# Patient Record
Sex: FEMALE | Race: WHITE | NOT HISPANIC OR LATINO | Employment: FULL TIME | URBAN - METROPOLITAN AREA
[De-identification: names, ages, dates, MRNs, and addresses within clinical notes are randomized per-mention and may not be internally consistent; named-entity substitution may affect disease eponyms.]

---

## 2017-10-29 ENCOUNTER — OFFICE VISIT (OUTPATIENT)
Dept: URGENT CARE | Facility: CLINIC | Age: 52
End: 2017-10-29
Payer: COMMERCIAL

## 2017-10-29 ENCOUNTER — APPOINTMENT (OUTPATIENT)
Dept: RADIOLOGY | Facility: CLINIC | Age: 52
End: 2017-10-29
Payer: COMMERCIAL

## 2017-10-29 DIAGNOSIS — R05.9 COUGH: ICD-10-CM

## 2017-10-29 DIAGNOSIS — R06.02 SHORTNESS OF BREATH: ICD-10-CM

## 2017-10-29 PROCEDURE — 99203 OFFICE O/P NEW LOW 30 MIN: CPT

## 2017-10-29 PROCEDURE — G0463 HOSPITAL OUTPT CLINIC VISIT: HCPCS

## 2017-10-29 PROCEDURE — 71020 HB CHEST X-RAY 2VW FRONTAL&LATL: CPT

## 2017-11-03 ENCOUNTER — APPOINTMENT (OUTPATIENT)
Dept: RADIOLOGY | Facility: CLINIC | Age: 52
End: 2017-11-03
Payer: COMMERCIAL

## 2017-11-03 ENCOUNTER — OFFICE VISIT (OUTPATIENT)
Dept: URGENT CARE | Facility: CLINIC | Age: 52
End: 2017-11-03
Payer: COMMERCIAL

## 2017-11-03 ENCOUNTER — TRANSCRIBE ORDERS (OUTPATIENT)
Dept: URGENT CARE | Facility: CLINIC | Age: 52
End: 2017-11-03

## 2017-11-03 DIAGNOSIS — R05.9 COUGH: ICD-10-CM

## 2017-11-03 DIAGNOSIS — R06.02 SHORTNESS OF BREATH: ICD-10-CM

## 2017-11-03 PROCEDURE — 71020 HB CHEST X-RAY 2VW FRONTAL&LATL: CPT

## 2017-11-03 PROCEDURE — G0463 HOSPITAL OUTPT CLINIC VISIT: HCPCS

## 2017-11-03 PROCEDURE — 99213 OFFICE O/P EST LOW 20 MIN: CPT

## 2017-11-03 PROCEDURE — 94640 AIRWAY INHALATION TREATMENT: CPT

## 2017-11-07 NOTE — PROGRESS NOTES
Assessment  1  Shortness of breath (786 05) (R06 02)    Plan  Cough    · Albuterol Sulfate (2 5 MG/3ML) 0 083% Inhalation Nebulization Solution   · Ipratropium Bromide 0 02 % Inhalation Solution  Cough, Shortness of breath    · * XR CHEST PA & LATERAL; Status:Complete;   Done: [id number] 04:57PM    Discussion/Summary  Discussion Summary:   Pt treated on 10/29 for bronchitis with azithromycin, prednisone, and ventolin but pt continues to worsenxray showing interstitial fluid- no pneumonia to proceed to [facility] for further evaluation r/o heart failure  Chief Complaint  1  Cough  Chief Complaint Free Text Note Form: Patient relates was seen here on Sunday for cough given z-pack, prednisone and taking mucinex  Denies fever  Here today because she is not feeling better and short of breath  History of Present Illness  HPI: 47yo F p/w productive cough, sob, wheezing, fever x 2 weeks  Pt was treated for bronchitis here on 10/30 with azithromycin, prednisone, and albuterol [id number]  Pt states she is no better and sxs are only worsening  Hospital Based Practices Required Assessment:   Abuse And Domestic Violence Screen    Yes, the patient is safe at home  -- The patient states no one is hurting them  Depression And Suicide Screen  No, the patient has not had thoughts of hurting themself  No, the patient has not felt depressed in the past 7 days  Prefered Language is  english  Primary Language is  english  Review of Systems  Focused-Female:   Constitutional: No fever, no chills, feels well, no tiredness, no recent weight gain or loss  ENT: sore throat-- and-- nasal discharge, but-- no earache,-- no nosebleeds,-- no hearing loss-- and-- no hoarseness  Cardiovascular: no complaints of slow or fast heart rate, no chest pain, no palpitations, no leg claudication or lower extremity edema     Respiratory: shortness of breath,-- cough,-- wheezing-- and-- shortness of breath during exertion, but-- no orthopnea-- and-- no PND  Breasts: no complaints of breast pain, breast lump or nipple discharge  Gastrointestinal: no complaints of abdominal pain, no constipation, no nausea or diarrhea, no vomiting, no bloody stools  Genitourinary: no complaints of dysuria, no incontinence, no pelvic pain, no dysmenorrhea, no vaginal discharge or abnormal vaginal bleeding  Musculoskeletal: no complaints of arthralgia, no myalgia, no joint swelling or stiffness, no limb pain or swelling  Integumentary: no complaints of skin rash or lesion, no itching or dry skin, no skin wounds  Neurological: no complaints of headache, no confusion, no numbness or tingling, no dizziness or fainting  ROS Reviewed:   ROS reviewed  Active Problems  1  Acute bronchitis (466 0) (J20 9)   2  Cough (786 2) (R05)   3  Shortness of breath (786 05) (R06 02)    Past Medical History  1  History of Anxiety (300 00) (F41 9)   2  History of back pain (V13 59) (Z87 39)   3  History of hypercholesterolemia (V12 29) (Z86 39)   4  History of hypertension (V12 59) (Z86 79)   5  History of hypothyroidism (V12 29) (Z86 39)  Active Problems And Past Medical History Reviewed: The active problems and past medical history were reviewed and updated today  Family History  Family History Reviewed: The family history was reviewed and updated today  Social History   · Active smoker (305 1) (F17 200)   · No alcohol use  Social History Reviewed: The social history was reviewed and is unchanged  Surgical History  Surgical History Reviewed: The surgical history was reviewed and updated today  Current Meds   1  AmLODIPine Besylate TABS; Therapy: (Recorded:80Jal9937) to Recorded   2  Atorvastatin Calcium 20 MG Oral Tablet; Therapy: (Recorded:29Oct2017) to Recorded   3  Azithromycin 250 MG Oral Tablet; TAKE 2 TABLETS ON DAY 1 THEN TAKE 1 TABLET A   DAY FOR 4 DAYS;    Therapy: [id number] to 24 [id number])  Requested for: 53-41-43-35; Last   Rx:2017 Ordered   4  Levothyroxine Sodium 100 MCG Oral Tablet; Therapy: (BAGALPBQ:67MAK4222) to Recorded   5  Losartan Potassium 25 MG Oral Tablet; Therapy: (XYODUUMS:53BWH9166) to Recorded   6  Metoprolol Tartrate 50 MG Oral Tablet; Therapy: (SXHSJCBX:19LRK2962) to Recorded   7  Montelukast Sodium 4 MG Oral Tablet Chewable; Therapy: (RTHFLQNM:55WMC0996) to Recorded   8  Morphine Sulfate ER 15 MG TB12;   Therapy: (OGWPWJRA:61FPO2365) to Recorded   9  Neurontin TABS; Therapy: (Recorded:2017) to Recorded   10  Paxil 20 MG Oral Tablet; Therapy: ([id number]) to Recorded   11  Plavix 75 MG Oral Tablet; Therapy: (BUGCFNOX:33JUF1624) to Recorded   12  PredniSONE 50 MG Oral Tablet; TAKE 1 TABLET DAILY WITH FOOD; Therapy: 29YRF4545 to (Kate Ng)  Requested for: 53-41-43-35; Last    Rx:2017 Ordered   13  Vicodin 5-300 MG Oral Tablet; Therapy: (PZAKXTAS:83DOS1118) to Recorded  Medication List Reviewed: The medication list was reviewed and updated today  Allergies  1  Sulfa Drugs   2  Vancomycin    Vitals  Signs   Recorded: 53NOL0320 04:43PM   Temperature: 99 5 F, Tympanic  Heart Rate: 75  Respiration: 24  Systolic: 466, RUE, Sitting  Diastolic: 72, RUE, Sitting  Height: 5 ft 4 in  Weight: 217 lb 2 oz  BMI Calculated: 37 27  BSA Calculated: 2 03  O2 Saturation: 96, RA  Pain Scale: 0    Physical Exam    Constitutional   General appearance: No acute distress, well appearing and well nourished  Pulmonary   Respiratory effort: No increased work of breathing or signs of respiratory distress  Auscultation of lungs: Abnormal   Auscultation of the lungs revealed expiratory wheezing,-- prolonged expiratory time-- and-- inspiratory wheezing  no rales or crackles were heard bilaterally  no rhonchi  no friction rub  diffuse wheezing bilaterally  no diminished breath sounds  Cardiovascular   Palpation of heart: Normal PMI, no thrills      Auscultation of heart: Normal rate and rhythm, normal S1 and S2, without murmurs  Examination of extremities for edema and/or varicosities: Normal     Abdomen   Abdomen: Non-tender, no masses  Liver and spleen: No hepatomegaly or splenomegaly  Musculoskeletal   Gait and station: Normal     Digits and nails: Normal without clubbing or cyanosis  Inspection/palpation of joints, bones, and muscles: Normal     Skin   Skin and subcutaneous tissue: Normal without rashes or lesions  Psychiatric   Orientation to person, place, and time: Normal     Mood and affect: Normal        Results/Data  * XR CHEST PA & LATERAL [id number] 04:57PM [facility] Order Number: [id number]   Performing Comments: ROOM 2     Test Name Result Flag Reference   XR CHEST PA & LATERAL (Report)     CHEST      INDICATION: Cough     COMPARISON: October 29, 2017     VIEWS: Frontal and lateral projections     IMAGES: 2     FINDINGS:        Cardiomediastinal silhouette appears unremarkable  Interstitial prominence in the lung seen  Lungs are hypoventilated   No acute airspace consolidation     Visualized osseous structures appear within normal limits for the patient's age  IMPRESSION:     Interstitial prominence in the lung may be due to mild congestion or due to hypoinflation     No acute airspace consolidation seen          Workstation performed: [id number]     Signed by:   Stanford Snowden MD   11/3/17       Signatures   Electronically signed by : SURENDRA Smith; Nov  3 2017  6:50PM EST                       (Author)    Electronically signed by : NANCIE Nicole ; Nov 6 2017 10:28AM EST                       (Co-author)

## 2018-01-18 NOTE — PROGRESS NOTES
Assessment    1  Active smoker (305 1) (F17 200)   2  Acute bronchitis (466 0) (J20 9)    Plan  Acute bronchitis    · Azithromycin 250 MG Oral Tablet; TAKE 2 TABLETS ON DAY 1 THEN TAKE 1  TABLET A DAY FOR 4 DAYS   · PredniSONE 50 MG Oral Tablet; TAKE 1 TABLET DAILY WITH FOOD  Cough    · * XR CHEST PA & LATERAL; Status:Active - Retrospective By Protocol Authorization; Requested [id number];     Discussion/Summary  Discussion Summary:   1) take antibiotic as prescribed  2) ventolin inhaler every 4 hours  3) take prednisone as prescribed  4) mucinex prn cough  Chief Complaint    1  Cough    History of Present Illness  HPI: 45yo F p/w cough productive of yellow sputum, wheezing, and sob x 1 week  Pt is everyday smoker and gets bronchitis at least yearly  Pt states she is short of breath only during coughing spells  Review of Systems  Focused-Female:   Constitutional: No fever, no chills, feels well, no tiredness, no recent weight gain or loss  ENT: no ear ache, no loss of hearing, no nosebleeds or nasal discharge, no sore throat or hoarseness  Cardiovascular: no complaints of slow or fast heart rate, no chest pain, no palpitations, no leg claudication or lower extremity edema  Respiratory: shortness of breath, cough and wheezing, but no orthopnea, no shortness of breath during exertion and no PND  Breasts: no complaints of breast pain, breast lump or nipple discharge  Gastrointestinal: no complaints of abdominal pain, no constipation, no nausea or diarrhea, no vomiting, no bloody stools  Genitourinary: no complaints of dysuria, no incontinence, no pelvic pain, no dysmenorrhea, no vaginal discharge or abnormal vaginal bleeding  Musculoskeletal: no complaints of arthralgia, no myalgia, no joint swelling or stiffness, no limb pain or swelling  Integumentary: no complaints of skin rash or lesion, no itching or dry skin, no skin wounds     Neurological: no complaints of headache, no confusion, no numbness or tingling, no dizziness or fainting  ROS Reviewed:   ROS reviewed  Past Medical History    1  History of Anxiety (300 00) (F41 9)   2  History of back pain (V13 59) (Z87 39)   3  History of hypercholesterolemia (V12 29) (Z86 39)   4  History of hypertension (V12 59) (Z86 79)   5  History of hypothyroidism (V12 29) (Z86 39)  Active Problems And Past Medical History Reviewed: The active problems and past medical history were reviewed and updated today  Family History  Family History Reviewed: The family history was reviewed and updated today  Social History    · Active smoker (305 1) (F17 200)   · No alcohol use  Social History Reviewed: The social history was reviewed and is unchanged  Surgical History  Surgical History Reviewed: The surgical history was reviewed and updated today  Current Meds   1  AmLODIPine Besylate TABS; Therapy: (Recorded:2017) to Recorded   2  Atorvastatin Calcium 20 MG Oral Tablet; Therapy: (Recorded:2017) to Recorded   3  Levothyroxine Sodium 100 MCG Oral Tablet; Therapy: (WIRBSXNK:97YIC8780) to Recorded   4  Losartan Potassium 25 MG Oral Tablet; Therapy: (PEYKFDJP:21PIA4572) to Recorded   5  Metoprolol Tartrate 50 MG Oral Tablet; Therapy: (TVCBHRKS:11IEB6752) to Recorded   6  Montelukast Sodium 4 MG Oral Tablet Chewable; Therapy: (CQDXGATW:81RQS3185) to Recorded   7  Morphine Sulfate ER 15 MG TB12;   Therapy: ([id number]) to Recorded   8  Neurontin TABS; Therapy: (FEJIODVT:20SNG9300) to Recorded   9  Paxil 20 MG Oral Tablet; Therapy: (Recorded:2017) to Recorded   10  Plavix 75 MG Oral Tablet; Therapy: (YXFTHFEZ:96HTC5290) to Recorded   11  Vicodin 5-300 MG Oral Tablet; Therapy: (OPVBNLXQ:23WCT1196) to Recorded  Medication List Reviewed: The medication list was reviewed and updated today  Allergies    1  Sulfa Drugs   2   Vancomycin    Vitals  Signs   Recorded: 92GYK6513 01:56PM   Temperature: 99 3 F, Tympanic  Heart Rate: 85  Respiration: 20  Systolic: 590  Diastolic: 80  Height: 5 ft 4 in  Weight: 215 lb   BMI Calculated: 36 9  BSA Calculated: 2 02  O2 Saturation: 97  Pain Scale: 5    Physical Exam    Constitutional   General appearance: No acute distress, well appearing and well nourished  Eyes   Conjunctiva and lids: No swelling, erythema or discharge  Pupils and irises: Equal, round and reactive to light  Ears, Nose, Mouth, and Throat   External inspection of ears and nose: Normal     Otoscopic examination: Tympanic membranes translucent with normal light reflex  Canals patent without erythema  Nasal mucosa, septum, and turbinates: Normal without edema or erythema  Oropharynx: Normal with no erythema, edema, exudate or lesions  Pulmonary   Respiratory effort: Abnormal   Respiratory rate: normal  Assessment of respiratory effort revealed normal rhythm and effort  Respiratory Findings: dry cough  Auscultation of lungs: Abnormal   Auscultation of the lungs revealed expiratory wheezing, prolonged expiratory time and inspiratory wheezing  no rales or crackles were heard bilaterally  no rhonchi  no friction rub  diffuse wheezing bilaterally  no diminished breath sounds  Cardiovascular   Palpation of heart: Normal PMI, no thrills  Auscultation of heart: Normal rate and rhythm, normal S1 and S2, without murmurs  Examination of extremities for edema and/or varicosities: Normal     Lymphatic   Palpation of lymph nodes in neck: No lymphadenopathy  Skin   Skin and subcutaneous tissue: Normal without rashes or lesions      Psychiatric   Orientation to person, place, and time: Normal     Mood and affect: Normal        Signatures   Electronically signed by : SURENDRA Davis; Oct 29 2017  2:13PM EST                       (Author)    Electronically signed by : NANCIE Inman ; Oct 30 2017  8:50AM EST                       (Co-author)

## 2019-01-17 ENCOUNTER — OFFICE VISIT (OUTPATIENT)
Dept: URGENT CARE | Facility: CLINIC | Age: 54
End: 2019-01-17
Payer: COMMERCIAL

## 2019-01-17 VITALS
HEART RATE: 77 BPM | WEIGHT: 212 LBS | DIASTOLIC BLOOD PRESSURE: 68 MMHG | TEMPERATURE: 98.3 F | RESPIRATION RATE: 18 BRPM | BODY MASS INDEX: 37.56 KG/M2 | HEIGHT: 63 IN | SYSTOLIC BLOOD PRESSURE: 100 MMHG | OXYGEN SATURATION: 97 %

## 2019-01-17 DIAGNOSIS — B34.9 VIRAL INFECTION: Primary | ICD-10-CM

## 2019-01-17 PROCEDURE — 99213 OFFICE O/P EST LOW 20 MIN: CPT | Performed by: PHYSICIAN ASSISTANT

## 2019-01-17 RX ORDER — ALBUTEROL SULFATE 1.25 MG/3ML
1.25 SOLUTION RESPIRATORY (INHALATION)
COMMUNITY
Start: 2015-07-27

## 2019-01-17 RX ORDER — LOSARTAN POTASSIUM 25 MG/1
TABLET ORAL
COMMUNITY

## 2019-01-17 RX ORDER — GABAPENTIN 800 MG/1
800 TABLET ORAL 3 TIMES DAILY
Refills: 1 | COMMUNITY
Start: 2018-10-26

## 2019-01-17 RX ORDER — CLINDAMYCIN PHOSPHATE 11.9 MG/ML
SOLUTION TOPICAL
COMMUNITY
Start: 2017-08-28

## 2019-01-17 RX ORDER — CLOPIDOGREL BISULFATE 75 MG/1
75 TABLET ORAL DAILY
Refills: 0 | COMMUNITY
Start: 2018-11-26

## 2019-01-17 RX ORDER — AMLODIPINE BESYLATE 10 MG/1
10 TABLET ORAL
COMMUNITY
Start: 2018-06-20

## 2019-01-17 RX ORDER — LEVOTHYROXINE SODIUM 0.1 MG/1
TABLET ORAL
COMMUNITY

## 2019-01-17 RX ORDER — GABAPENTIN 800 MG/1
TABLET ORAL
COMMUNITY

## 2019-01-17 RX ORDER — ALPRAZOLAM 0.5 MG/1
TABLET ORAL
COMMUNITY
Start: 2018-02-21

## 2019-01-17 RX ORDER — CETIRIZINE HYDROCHLORIDE 10 MG/1
10 TABLET ORAL DAILY
Qty: 30 TABLET | Refills: 0 | Status: SHIPPED | OUTPATIENT
Start: 2019-01-17

## 2019-01-17 RX ORDER — AMLODIPINE BESYLATE 10 MG/1
10 TABLET ORAL DAILY
Refills: 1 | COMMUNITY
Start: 2018-11-24

## 2019-01-17 RX ORDER — DIPHENOXYLATE HYDROCHLORIDE AND ATROPINE SULFATE 2.5; .025 MG/1; MG/1
1 TABLET ORAL
COMMUNITY
Start: 2013-02-04

## 2019-01-17 RX ORDER — OXYCODONE AND ACETAMINOPHEN 10; 325 MG/1; MG/1
TABLET ORAL
Refills: 0 | COMMUNITY
Start: 2019-01-10

## 2019-01-17 RX ORDER — FLUTICASONE PROPIONATE 50 MCG
2 SPRAY, SUSPENSION (ML) NASAL DAILY
Qty: 16 G | Refills: 0 | Status: SHIPPED | OUTPATIENT
Start: 2019-01-17

## 2019-01-17 RX ORDER — ATORVASTATIN CALCIUM 80 MG/1
TABLET, FILM COATED ORAL
Refills: 0 | COMMUNITY
Start: 2019-01-07

## 2019-01-17 RX ORDER — PAROXETINE HYDROCHLORIDE 40 MG/1
TABLET, FILM COATED ORAL
COMMUNITY
Start: 2017-11-16

## 2019-01-17 RX ORDER — MONTELUKAST SODIUM 10 MG/1
TABLET ORAL
COMMUNITY
Start: 2018-08-09

## 2019-01-17 RX ORDER — ATORVASTATIN CALCIUM 80 MG/1
80 TABLET, FILM COATED ORAL
COMMUNITY
Start: 2019-01-07

## 2019-01-17 RX ORDER — BENZONATATE 100 MG/1
100 CAPSULE ORAL 3 TIMES DAILY PRN
Qty: 15 CAPSULE | Refills: 0 | Status: SHIPPED | OUTPATIENT
Start: 2019-01-17

## 2019-01-17 RX ORDER — LOSARTAN POTASSIUM AND HYDROCHLOROTHIAZIDE 12.5; 1 MG/1; MG/1
1 TABLET ORAL
COMMUNITY
Start: 2018-12-27

## 2019-01-17 RX ORDER — CLOPIDOGREL BISULFATE 75 MG/1
75 TABLET ORAL
COMMUNITY
Start: 2018-11-26

## 2019-01-17 RX ORDER — LEVOTHYROXINE SODIUM 0.15 MG/1
150 TABLET ORAL
COMMUNITY
Start: 2018-06-20

## 2019-01-17 NOTE — PROGRESS NOTES
[facility]        NAME: Brandi Client is a 48 y o  female  : 1965    MRN: [id number]  DATE: 2019  TIME: 6:13 PM    Assessment and Plan   Viral infection [B34 9]  1  Viral infection  fluticasone (FLONASE) 50 mcg/act nasal spray    cetirizine (ZyrTEC) 10 mg tablet    benzonatate (TESSALON PERLES) 100 mg capsule    Ambulatory referral to [facility]         Patient Instructions     Motrin and/or Tylenol as needed for fever control and aches and pains  Drink plenty of fluids and stay well hydrated  No smoking  Follow up with PCP in 3-5 days  Proceed to  ER if symptoms worsen  Chief Complaint     Chief Complaint   Patient presents with    Influenza     fever, body aches, headache, nausea, diarrhea, congestion, cough  Symptoms for five days  History of Present Illness       19-year-old female presents with five-day history of runny nose congestion cough body aches and pains and fevers  Denies any chest pain or abdominal pain  Had a couple bouts of diarrhea  Sick contact with daughter with some type of viral infection  Has been taking over-the-counter Mucinex without any relief in symptoms  Patient is a smoker  And is asthmatic  Generalized Body Aches   The current episode started in the past 7 days  The problem has been waxing and waning since onset  The pain is moderate  Nothing aggravates the symptoms  Associated symptoms include congestion, rhinorrhea, fatigue, a fever, coughing, diarrhea, joint pain and muscle aches  Treatments tried: Mucinex  The treatment provided mild relief  The fever has been present for 1 to 2 days  The maximum temperature noted was 102 2 to 104 0 F  The temperature was taken using an oral thermometer  The cough has no precipitants  The cough is non-productive  There is no color change associated with the cough  Nothing relieves the cough  Nothing worsens the cough  There is nasal congestion   The congestion does not interfere with sleep  The congestion does not interfere with eating or drinking  The rhinorrhea has been occurring frequently  The nasal discharge has a clear appearance  She has been experiencing a mild sore throat  The sore throat is characterized by pain only  The diarrhea occurs 2 to 4 times per day  The diarrhea is watery  There were sick contacts at home  Review of Systems   Review of Systems   Constitutional: Positive for fatigue and fever  HENT: Positive for congestion and rhinorrhea  Eyes: Negative  Respiratory: Positive for cough  Cardiovascular: Negative  Gastrointestinal: Positive for diarrhea  Musculoskeletal: Positive for joint pain  Skin: Negative  Neurological: Negative  Current Medications       Current Outpatient Prescriptions:     albuterol (ACCUNEB) 1 25 MG/3ML nebulizer solution, Inhale 1 25 mg, Disp: , Rfl:     ALPRAZolam (XANAX) 0 5 mg tablet, 1 tablet daily as needed, Disp: , Rfl:     amLODIPine (NORVASC) 10 mg tablet, Take 10 mg by mouth, Disp: , Rfl:     atorvastatin (LIPITOR) 80 mg tablet, Take 80 mg by mouth, Disp: , Rfl:     B Complex Vitamins (VITAMIN B COMPLEX PO), Take 1 capsule by mouth, Disp: , Rfl:     clindamycin (CLEOCIN T) 1 % external solution, Apply topically, Disp: , Rfl:     clopidogrel (PLAVIX) 75 mg tablet, Take 75 mg by mouth, Disp: , Rfl:     levothyroxine 150 mcg tablet, Take 150 mcg by mouth, Disp: , Rfl:     losartan-hydrochlorothiazide (HYZAAR) 100-12 5 MG per tablet, Take 1 tablet by mouth, Disp: , Rfl:     metoprolol tartrate (LOPRESSOR) 25 mg tablet, 1/2 tablet twice daily, Disp: , Rfl:     montelukast (SINGULAIR) 10 mg tablet, TAKE 1 TABLET ORALLY AT BEDTIME, Disp: , Rfl:     Multiple Vitamins-Minerals (MULTIVITAMIN ADULT EXTRA C PO), Take 1 tablet by mouth daily, Disp: , Rfl:     multivitamin (THERAGRAN) TABS, Take 1 tablet by mouth, Disp: , Rfl:     PARoxetine (PAXIL) 40 MG tablet, TAKE 1 TABLET BY MOUTH DAILY  , Disp: , Rfl:   Probiotic Product (PROBIOTIC-10 PO), Take 1 capsule by mouth daily, Disp: , Rfl:     amLODIPine (NORVASC) 10 mg tablet, Take 10 mg by mouth daily, Disp: , Rfl: 1    ASPIRIN 81 PO, Take 81 mg by mouth daily, Disp: , Rfl:     atorvastatin (LIPITOR) 80 mg tablet, TAKE 1 TABLET BY MOUTH EVERY DAY AT NIGHT, Disp: , Rfl: 0    benzonatate (TESSALON PERLES) 100 mg capsule, Take 1 capsule (100 mg total) by mouth 3 (three) times a day as needed for cough, Disp: 15 capsule, Rfl: 0    cetirizine (ZyrTEC) 10 mg tablet, Take 1 tablet (10 mg total) by mouth daily, Disp: 30 tablet, Rfl: 0    Cholecalciferol 29437 units TABS, Take 5,000 Units by mouth daily, Disp: , Rfl:     clopidogrel (PLAVIX) 75 mg tablet, Take 75 mg by mouth daily, Disp: , Rfl: 0    fluticasone (FLONASE) 50 mcg/act nasal spray, 2 sprays into each nostril daily, Disp: 16 g, Rfl: 0    gabapentin (NEURONTIN) 800 mg tablet, Take 800 mg by mouth 3 (three) times a day, Disp: , Rfl: 1    gabapentin (NEURONTIN) 800 mg tablet, Take by mouth, Disp: , Rfl:     levothyroxine 100 mcg tablet, Take by mouth, Disp: , Rfl:     losartan (COZAAR) 25 mg tablet, Take by mouth, Disp: , Rfl:     Omega-3 Fatty Acids (FISH OIL PO), Take 1 capsule by mouth, Disp: , Rfl:     oxyCODONE-acetaminophen (PERCOCET)  mg per tablet, TAKE ONE TAB BY MOUTH 3 TIMES A DAY AS NEEDED FOR MODERATE-SEVERE PAIN  (FILL ON OR AFTER 1/10/19), Disp: , Rfl: 0    Current Allergies     Allergies as of 01/17/2019 - Reviewed 01/17/2019   Allergen Reaction Noted    Vancomycin  10/29/2017    Sulfa antibiotics Rash 07/24/2013            The following portions of the patient's history were reviewed and updated as appropriate: allergies, current medications, past family history, past medical history, past social history, past surgical history and problem list      History reviewed  No pertinent past medical history      Past Surgical History:   Procedure Laterality Date    APPENDECTOMY      CARDIAC VALVE SURGERY      GALLBLADDER SURGERY         History reviewed  No pertinent family history  Medications have been verified  Objective   /68   Pulse 77   Temp 98 3 °F (36 8 °C)   Resp 18   Ht 5' 3" (1 6 m)   Wt 96 2 kg (212 lb)   SpO2 97%   BMI 37 55 kg/m²        Physical Exam     Physical Exam   Constitutional: She is oriented to person, place, and time  She appears well-developed and well-nourished  No distress  HENT:   Head: Normocephalic and atraumatic  Right Ear: External ear normal    Left Ear: External ear normal    Nose: Nose normal    Mouth/Throat: Oropharynx is clear and moist  No oropharyngeal exudate  Eyes: Conjunctivae are normal  Right eye exhibits no discharge  Left eye exhibits no discharge  Neck: Normal range of motion  Neck supple  Cardiovascular: Normal rate, regular rhythm, normal heart sounds and intact distal pulses  No murmur heard  Pulmonary/Chest: Effort normal and breath sounds normal  No respiratory distress  She has no wheezes  She has no rales  Abdominal: Soft  Bowel sounds are normal  There is no tenderness  Musculoskeletal: Normal range of motion  Lymphadenopathy:     She has no cervical adenopathy  Neurological: She is alert and oriented to person, place, and time  Skin: Skin is warm and dry  Psychiatric: She has a normal mood and affect  Nursing note and vitals reviewed

## 2019-01-17 NOTE — PATIENT INSTRUCTIONS
Motrin and/or Tylenol as needed for fever control and aches and pains  Drink plenty of fluids and stay well hydrated  No smoking  Follow up with PCP in 3-5 days  Proceed to  ER if symptoms worsen  Viral Syndrome   AMBULATORY CARE:   Viral syndrome  is a term used for general symptoms of a viral infection that has no clear cause  Viruses are spread easily from person to person through the air and on shared items  Common symptoms include the following:   · Fever and chills    · A runny or stuffy nose     · Cough, sore throat, or hoarseness     · Headache, or pain and pressure around your eyes     · Muscle aches and joint pain     · Shortness of breath or wheezing     · Abdominal pain, cramps, and diarrhea     · Nausea, vomiting, or loss of appetite  Call 911 for the following:   · You have a seizure  · You cannot be woken  · You have chest pain or trouble breathing  Seek care immediately if:   · You have a stiff neck, a bad headache, and sensitivity to light  · You feel weak, dizzy, or confused  · You stop urinating or urinate a lot less than normal      · You cough up blood or thick, yellow or green, mucus  · You have severe abdominal pain or your abdomen is larger than usual   Contact your healthcare provider if:   · Your symptoms do not get better with treatment, or get worse, after 3 days  · You have a rash or ear pain  · You have burning when you urinate  · You have questions or concerns about your condition or care  Treatment for viral syndrome  may include medicines to manage your symptoms  You may  need any of the following:  · Acetaminophen  decreases pain and fever  It is available without a doctor's order  Ask how much medicine to take and how often to take it  Follow directions  Acetaminophen can cause liver damage if not taken correctly  · NSAIDs , such as ibuprofen, help decrease swelling, pain, and fever   NSAIDs can cause stomach bleeding or kidney problems in certain people  If you take blood thinner medicine, always ask your healthcare provider if NSAIDs are safe for you  Always read the medicine label and follow directions  · Cold medicine  helps decrease swelling, control a cough, and relieve chest or nasal congestion  · Saline nasal spray  helps decrease nasal congestion  · Take your medicine as directed  Contact your healthcare provider if you think your medicine is not helping or if you have side effects  Tell him of her if you are allergic to any medicine  Keep a list of the medicines, vitamins, and herbs you take  Include the amounts, and when and why you take them  Bring the list or the pill bottles to follow-up visits  Carry your medicine list with you in case of an emergency  Manage your symptoms:   · Drink liquids as directed  to prevent dehydration  Ask how much liquid to drink each day and which liquids are best for you  Ask if you should drink an oral rehydration solution (ORS)  An ORS has the right amounts of water, salts, and sugar you need to replace body fluids  This may help prevent dehydration caused by vomiting or diarrhea  Do not drink liquids with caffeine  Drinks with caffeine can make dehydration worse  · Get plenty of rest  to help your body heal  Take naps throughout the day  Ask your healthcare provider when you can return to work and your normal activities  · Use a cool mist humidifier  to help you breathe easier if you have nasal or chest congestion  Ask your healthcare provider how to use a cool mist humidifier  · Eat honey or use cough drops  to help decrease throat discomfort  Ask your healthcare provider how much honey you should eat each day  Cough drops are available without a doctor's order  Follow directions for taking cough drops  · Do not smoke and stay away from others who smoke  Nicotine and other chemicals in cigarettes and cigars can cause lung damage  Smoking can also delay healing   Ask your healthcare provider for information if you currently smoke and need help to quit  E-cigarettes or smokeless tobacco still contain nicotine  Talk to your healthcare provider before you use these products  · Wash your hands frequently  to prevent the spread of germs to others  Use soap and water  Use gel hand  when soap and water are not available  Wash your hands after you use the bathroom, cough, or sneeze  Wash your hands before you prepare or eat food  Follow up with your healthcare provider as directed:  Write down your questions so you remember to ask them during your visits  © 2017 [address] [facility] Information is for End User's use only and may not be sold, redistributed or otherwise used for commercial purposes  All illustrations and images included in CareNotes® are the copyrighted property of A D [employer] M [employer]  or [employer]  The above information is an  only  It is not intended as medical advice for individual conditions or treatments  Talk to your doctor, nurse or pharmacist before following any medical regimen to see if it is safe and effective for you

## 2019-01-18 RX ORDER — LACTOBACILLUS ACIDOPHILUS 500MM CELL
1 CAPSULE ORAL
COMMUNITY
Start: 2014-09-29

## 2019-01-18 RX ORDER — PHENYLEPHRINE HCL 10 MG
1 TABLET ORAL
COMMUNITY

## 2022-06-23 ENCOUNTER — TELEPHONE (OUTPATIENT)
Dept: OBGYN CLINIC | Facility: MEDICAL CENTER | Age: 57
End: 2022-06-23

## 2022-06-23 NOTE — TELEPHONE ENCOUNTER
Patient calling for an appointment, she did not want to travel to [facility] for Dr Madhuri Kinney

## 2024-02-28 ENCOUNTER — OFFICE VISIT (OUTPATIENT)
Dept: URGENT CARE | Facility: CLINIC | Age: 59
End: 2024-02-28
Payer: COMMERCIAL

## 2024-02-28 VITALS
HEIGHT: 64 IN | HEART RATE: 77 BPM | SYSTOLIC BLOOD PRESSURE: 90 MMHG | WEIGHT: 210 LBS | DIASTOLIC BLOOD PRESSURE: 66 MMHG | BODY MASS INDEX: 35.85 KG/M2 | RESPIRATION RATE: 20 BRPM | TEMPERATURE: 96.8 F | OXYGEN SATURATION: 88 %

## 2024-02-28 DIAGNOSIS — R05.1 ACUTE COUGH: ICD-10-CM

## 2024-02-28 DIAGNOSIS — J44.1 COPD EXACERBATION (HCC): ICD-10-CM

## 2024-02-28 DIAGNOSIS — R06.02 SOB (SHORTNESS OF BREATH): Primary | ICD-10-CM

## 2024-02-28 PROCEDURE — S9083 URGENT CARE CENTER GLOBAL: HCPCS | Performed by: NURSE PRACTITIONER

## 2024-02-28 PROCEDURE — G0382 LEV 3 HOSP TYPE B ED VISIT: HCPCS | Performed by: NURSE PRACTITIONER

## 2024-02-28 NOTE — PROGRESS NOTES
[facility]        NAME: Marylee Billman is a 58 y.o. female  : 1965    MRN: [id number]  DATE: 2024  TIME: 5:27 PM    Assessment and Plan   SOB (shortness of breath) [R06.02]  1. SOB (shortness of breath)        2. Acute cough        3. COPD exacerbation (HCC)          Report to ED  will transport using home oxygen at 2 L    Patient Instructions       ED for further evaluation    Chief Complaint     Chief Complaint   Patient presents with   • Shortness of Breath     Has been sick for two months. Was seen at another urgent care 2 weeks ago and they gave her doxycycline and it didn't help. Is now having shortness of breath has been using a nebulizer, inhalers and oxygen.          History of Present Illness       Patient is a 58-year-old female arrives with complaints of shortness of breath persistent cough x 2 months was seen previously in a different urgent care was provided Doxy without relief.  She reports she does take breztri twice daily and does have a rescue inhaler.  She reports at the time of her visit they did not provide her with any prednisone.  At her office visit here her oxygen at arrival was running between 87 to 89% on room air patient does report that recently at home she has been using oxygen.  She reports she uses 2 L.  Prior to this illness she was on room air ranging anywhere between 96 to 97%.  She reports the shortness of breath has been increasing now she is dizzy with lightheadedness.  And audible wheezing.  She does report some intermittent chest pain denies palpitations does have much increasing fatigue and coughing up thick greenish-yellow mucus.  Her blood pressure also is running on the low side 90/66.  She does report her normal blood pressure is typically around 116/76.  She does have a past medical history of COPD.  Does also report a decrease in appetite        Review of Systems   Review of Systems   Constitutional:  Negative for activity  change, appetite change, chills, fatigue and fever.   HENT:  Negative for congestion, postnasal drip, rhinorrhea, sneezing and sore throat.    Respiratory:  Positive for cough, chest tightness, shortness of breath and wheezing.    Cardiovascular:  Positive for chest pain. Negative for palpitations.   Gastrointestinal:  Negative for abdominal pain, constipation, diarrhea, nausea and vomiting.   Musculoskeletal:  Negative for arthralgias and myalgias.   Skin:  Negative for color change, pallor and rash.   Neurological:  Positive for dizziness and light-headedness. Negative for weakness and headaches.   Hematological:  Negative for adenopathy.   Psychiatric/Behavioral:  Negative for agitation and confusion.          Current Medications       Current Outpatient Medications:   •  Acidophilus Lactobacillus CAPS, Take 1 capsule by mouth, Disp: , Rfl:   •  albuterol (ACCUNEB) 1.25 MG/3ML nebulizer solution, Inhale 1.25 mg, Disp: , Rfl:   •  albuterol (PROVENTIL HFA,VENTOLIN HFA) 90 mcg/act inhaler, Inhale 2 puffs, Disp: , Rfl:   •  ALPRAZolam (XANAX) 0.5 mg tablet, 1 tablet daily as needed, Disp: , Rfl:   •  amLODIPine (NORVASC) 10 mg tablet, Take 10 mg by mouth, Disp: , Rfl:   •  aspirin 81 MG tablet, Take 81 mg by mouth, Disp: , Rfl:   •  atorvastatin (LIPITOR) 80 mg tablet, Take 80 mg by mouth, Disp: , Rfl:   •  Cholecalciferol 00087 units TABS, Take 5,000 Units by mouth daily, Disp: , Rfl:   •  clindamycin (CLEOCIN T) 1 % external solution, Apply topically, Disp: , Rfl:   •  clopidogrel (PLAVIX) 75 mg tablet, Take 75 mg by mouth, Disp: , Rfl:   •  gabapentin (NEURONTIN) 800 mg tablet, Take 800 mg by mouth 3 (three) times a day, Disp: , Rfl: 1  •  levothyroxine 150 mcg tablet, Take 150 mcg by mouth, Disp: , Rfl:   •  losartan (COZAAR) 25 mg tablet, Take by mouth, Disp: , Rfl:   •  losartan-hydrochlorothiazide (HYZAAR) 100-12.5 MG per tablet, Take 1 tablet by mouth, Disp: , Rfl:   •  metoprolol tartrate (LOPRESSOR) 25 mg  tablet, 1/2 tablet twice daily, Disp: , Rfl:   •  montelukast (SINGULAIR) 10 mg tablet, TAKE 1 TABLET ORALLY AT BEDTIME, Disp: , Rfl:   •  Multiple Vitamins-Minerals (MULTIVITAMIN ADULT EXTRA C PO), Take 1 tablet by mouth daily, Disp: , Rfl:   •  multivitamin (THERAGRAN) TABS, Take 1 tablet by mouth, Disp: , Rfl:   •  Omega-3 Fatty Acids (FISH OIL PO), Take 1 capsule by mouth, Disp: , Rfl:   •  PARoxetine (PAXIL) 40 MG tablet, TAKE 1 TABLET BY MOUTH DAILY., Disp: , Rfl:   •  Probiotic Product (PROBIOTIC-10 PO), Take 1 capsule by mouth daily, Disp: , Rfl:   •  amLODIPine (NORVASC) 10 mg tablet, Take 10 mg by mouth daily, Disp: , Rfl: 1  •  ASPIRIN 81 PO, Take 81 mg by mouth daily, Disp: , Rfl:   •  atorvastatin (LIPITOR) 80 mg tablet, TAKE 1 TABLET BY MOUTH EVERY DAY AT NIGHT, Disp: , Rfl: 0  •  B Complex Vitamins (VITAMIN B COMPLEX PO), Take 1 capsule by mouth (Patient not taking: Reported on 2/28/2024), Disp: , Rfl:   •  B Complex-C-Folic Acid (B-COMPLEX/VITAMIN C) TABS, Take 1 tablet by mouth (Patient not taking: Reported on 2/28/2024), Disp: , Rfl:   •  benzonatate (TESSALON PERLES) 100 mg capsule, Take 1 capsule (100 mg total) by mouth 3 (three) times a day as needed for cough (Patient not taking: Reported on 2/28/2024), Disp: 15 capsule, Rfl: 0  •  cetirizine (ZyrTEC) 10 mg tablet, Take 1 tablet (10 mg total) by mouth daily (Patient not taking: Reported on 2/28/2024), Disp: 30 tablet, Rfl: 0  •  clopidogrel (PLAVIX) 75 mg tablet, Take 75 mg by mouth daily, Disp: , Rfl: 0  •  fluticasone (FLONASE) 50 mcg/act nasal spray, 2 sprays into each nostril daily (Patient not taking: Reported on 2/28/2024), Disp: 16 g, Rfl: 0  •  gabapentin (NEURONTIN) 800 mg tablet, Take by mouth (Patient not taking: Reported on 2/28/2024), Disp: , Rfl:   •  levothyroxine 100 mcg tablet, Take by mouth (Patient not taking: Reported on 2/28/2024), Disp: , Rfl:   •  oxyCODONE-acetaminophen (PERCOCET)  mg per tablet, TAKE ONE TAB BY  "MOUTH 3 TIMES A DAY AS NEEDED FOR MODERATE-SEVERE PAIN.(FILL ON OR AFTER 1/10/19) (Patient not taking: Reported on 2/28/2024), Disp: , Rfl: 0    Current Allergies     Allergies as of 02/28/2024 - Reviewed 02/28/2024   Allergen Reaction Noted   • Vancomycin  10/29/2017   • Sulfa antibiotics Rash 07/24/2013            The following portions of the patient's history were reviewed and updated as appropriate: allergies, current medications, past family history, past medical history, past social history, past surgical history and problem list.     Past Medical History:   Diagnosis Date   • Anxiety    • Coronary artery disease    • Depression    • Diabetes mellitus (HCC)     type 2   • Hidradenitis    • Hypertension    • MRSA (methicillin resistant Staphylococcus aureus)    • Patent ductus arteriosus    • Scoliosis        Past Surgical History:   Procedure Laterality Date   • APPENDECTOMY     • AXILLARY HIDRADENITIS EXCISION     • CARDIAC VALVE SURGERY     • GALLBLADDER SURGERY     • INGUINAL HIDRADENITIS EXCISION     • PATENT DUCTUS ARTERIOUS LIGATION         Family History   Problem Relation Age of Onset   • Cirrhosis Brother    • Graves' disease Daughter    • Hypertension Father    • Diabetes Father    • Stroke Mother    • Neuropathy Mother    • Hypertension Mother    • Diabetes Mother    • Heart disease Mother    • Cirrhosis Sister          Medications have been verified.        Objective   BP 90/66   Pulse 77   Temp (!) 96.8 °F (36 °C)   Resp 20   Ht 5' 4\" (1.626 m)   Wt 95.3 kg (210 lb)   SpO2 (!) 88%   BMI 36.05 kg/m²   No LMP recorded. Patient is postmenopausal.       Physical Exam     Physical Exam  Vitals and nursing note reviewed.   Constitutional:       General: She is not in acute distress.     Appearance: Normal appearance. She is ill-appearing. She is not diaphoretic.   HENT:      Head: Normocephalic and atraumatic.      Right Ear: Tympanic membrane, ear canal and external ear normal. There is no " impacted cerumen.      Left Ear: Tympanic membrane, ear canal and external ear normal. There is no impacted cerumen.      Nose: No congestion or rhinorrhea.      Mouth/Throat:      Pharynx: Posterior oropharyngeal erythema present.   Eyes:      General: No scleral icterus.        Right eye: No discharge.         Left eye: No discharge.      Conjunctiva/sclera: Conjunctivae normal.   Cardiovascular:      Rate and Rhythm: Normal rate and regular rhythm.   Pulmonary:      Effort: Pulmonary effort is normal. No respiratory distress.      Breath sounds: Wheezing, rhonchi and rales present.   Musculoskeletal:         General: Normal range of motion.      Cervical back: Normal range of motion.   Lymphadenopathy:      Cervical: Cervical adenopathy present.   Skin:     Coloration: Skin is not jaundiced or pale.      Findings: No bruising, erythema or rash.   Neurological:      General: No focal deficit present.      Mental Status: She is alert and oriented to person, place, and time.   Psychiatric:         Mood and Affect: Mood normal.         Behavior: Behavior normal.         Thought Content: Thought content normal.         Judgment: Judgment normal.

## 2024-12-28 ENCOUNTER — OFFICE VISIT (OUTPATIENT)
Dept: URGENT CARE | Facility: CLINIC | Age: 59
End: 2024-12-28
Payer: COMMERCIAL

## 2024-12-28 VITALS
HEIGHT: 64 IN | DIASTOLIC BLOOD PRESSURE: 80 MMHG | BODY MASS INDEX: 35 KG/M2 | SYSTOLIC BLOOD PRESSURE: 109 MMHG | RESPIRATION RATE: 18 BRPM | TEMPERATURE: 98.3 F | HEART RATE: 78 BPM | OXYGEN SATURATION: 93 % | WEIGHT: 205 LBS

## 2024-12-28 DIAGNOSIS — N30.01 ACUTE CYSTITIS WITH HEMATURIA: Primary | ICD-10-CM

## 2024-12-28 DIAGNOSIS — R39.89 BLADDER PAIN: ICD-10-CM

## 2024-12-28 LAB
SL AMB  POCT GLUCOSE, UA: NEGATIVE
SL AMB LEUKOCYTE ESTERASE,UA: ABNORMAL
SL AMB POCT BILIRUBIN,UA: ABNORMAL
SL AMB POCT BLOOD,UA: ABNORMAL
SL AMB POCT CLARITY,UA: ABNORMAL
SL AMB POCT COLOR,UA: ABNORMAL
SL AMB POCT KETONES,UA: ABNORMAL
SL AMB POCT NITRITE,UA: NEGATIVE
SL AMB POCT PH,UA: 8.5
SL AMB POCT SPECIFIC GRAVITY,UA: 1.01
SL AMB POCT URINE PROTEIN: 2000
SL AMB POCT UROBILINOGEN: 8

## 2024-12-28 PROCEDURE — 81002 URINALYSIS NONAUTO W/O SCOPE: CPT | Performed by: FAMILY MEDICINE

## 2024-12-28 PROCEDURE — 99213 OFFICE O/P EST LOW 20 MIN: CPT | Performed by: FAMILY MEDICINE

## 2024-12-28 PROCEDURE — 87086 URINE CULTURE/COLONY COUNT: CPT | Performed by: FAMILY MEDICINE

## 2024-12-28 PROCEDURE — S9083 URGENT CARE CENTER GLOBAL: HCPCS | Performed by: FAMILY MEDICINE

## 2024-12-28 RX ORDER — CIPROFLOXACIN 500 MG/1
500 TABLET, FILM COATED ORAL EVERY 12 HOURS SCHEDULED
Qty: 10 TABLET | Refills: 0 | Status: SHIPPED | OUTPATIENT
Start: 2024-12-28 | End: 2025-01-02

## 2024-12-28 RX ORDER — PANTOPRAZOLE SODIUM 40 MG/1
40 TABLET, DELAYED RELEASE ORAL DAILY
COMMUNITY
Start: 2024-12-10

## 2024-12-28 RX ORDER — LOSARTAN POTASSIUM AND HYDROCHLOROTHIAZIDE 25; 100 MG/1; MG/1
TABLET ORAL
COMMUNITY
Start: 2024-12-18

## 2024-12-28 RX ORDER — BUDESONIDE AND FORMOTEROL FUMARATE DIHYDRATE 160; 4.5 UG/1; UG/1
2 AEROSOL RESPIRATORY (INHALATION) 2 TIMES DAILY
COMMUNITY
Start: 2024-11-04

## 2024-12-28 RX ORDER — GUAIFENESIN 600 MG/1
TABLET, EXTENDED RELEASE ORAL
COMMUNITY
Start: 2024-03-01 | End: 2025-03-01

## 2024-12-28 RX ORDER — BACLOFEN 10 MG/1
10 TABLET ORAL 3 TIMES DAILY PRN
COMMUNITY
Start: 2024-12-02

## 2024-12-28 RX ORDER — NITROGLYCERIN 0.3 MG/1
1 TABLET SUBLINGUAL
COMMUNITY

## 2024-12-28 NOTE — PROGRESS NOTES
"Name: Marylee Billman      : 1965      MRN: [id number]  Encounter Provider: Hua Elena DO  Encounter Date: 2024   Encounter department: [facility]  :  Assessment & Plan  Bladder pain    Orders:  •  POCT urine dip  •  Urine culture    Acute cystitis with hematuria    Orders:  •  ciprofloxacin (CIPRO) 500 mg tablet; Take 1 tablet (500 mg total) by mouth every 12 (twelve) hours for 5 days        History of Present Illness   Patient presents with:  Possible UTI: Bladder pressure and pain during and after urination started 4 days ago. Left back pain started 2 days ago. Blood in urine          Marylee Billman is a 59 y.o. female who presents   History obtained from: patient    Review of Systems   Constitutional: Negative.    HENT: Negative.     Respiratory: Negative.     Cardiovascular: Negative.    Gastrointestinal: Negative.    Genitourinary:  Positive for difficulty urinating, dysuria and frequency.     Pertinent Medical History            Objective   /80   Pulse 78   Temp 98.3 °F (36.8 °C)   Resp 18   Ht 5' 4\" (1.626 m)   Wt 93 kg (205 lb)   SpO2 93%   BMI 35.19 kg/m²      Physical Exam  Vitals and nursing note reviewed.   Constitutional:       Appearance: She is well-developed.   HENT:      Head: Normocephalic and atraumatic.   Eyes:      Pupils: Pupils are equal, round, and reactive to light.   Cardiovascular:      Rate and Rhythm: Normal rate.   Pulmonary:      Effort: Pulmonary effort is normal.   Abdominal:      Palpations: Abdomen is soft.   Musculoskeletal:         General: Normal range of motion.      Cervical back: Normal range of motion and neck supple.   Skin:     General: Skin is warm.   Neurological:      Mental Status: She is alert and oriented to person, place, and time.   Psychiatric:         Behavior: Behavior normal.           "

## 2024-12-30 LAB — BACTERIA UR CULT: NORMAL
